# Patient Record
Sex: FEMALE | Race: WHITE | NOT HISPANIC OR LATINO | Employment: STUDENT | URBAN - METROPOLITAN AREA
[De-identification: names, ages, dates, MRNs, and addresses within clinical notes are randomized per-mention and may not be internally consistent; named-entity substitution may affect disease eponyms.]

---
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
## 2019-08-05 ENCOUNTER — ANNUAL EXAM (OUTPATIENT)
Dept: GYNECOLOGY | Facility: CLINIC | Age: 19
End: 2019-08-05
Payer: COMMERCIAL

## 2019-08-05 VITALS
BODY MASS INDEX: 34.62 KG/M2 | WEIGHT: 228.4 LBS | DIASTOLIC BLOOD PRESSURE: 70 MMHG | SYSTOLIC BLOOD PRESSURE: 110 MMHG | HEIGHT: 68 IN

## 2019-08-05 DIAGNOSIS — Z30.011 ENCOUNTER FOR PRESCRIPTION OF ORAL CONTRACEPTIVES: ICD-10-CM

## 2019-08-05 DIAGNOSIS — Z20.2 POSSIBLE EXPOSURE TO STD: ICD-10-CM

## 2019-08-05 DIAGNOSIS — Z01.419 ENCNTR FOR GYN EXAM (GENERAL) (ROUTINE) W/O ABN FINDINGS: Primary | ICD-10-CM

## 2019-08-05 PROCEDURE — 87491 CHLMYD TRACH DNA AMP PROBE: CPT | Performed by: NURSE PRACTITIONER

## 2019-08-05 PROCEDURE — 87591 N.GONORRHOEAE DNA AMP PROB: CPT | Performed by: NURSE PRACTITIONER

## 2019-08-05 PROCEDURE — S0612 ANNUAL GYNECOLOGICAL EXAMINA: HCPCS | Performed by: NURSE PRACTITIONER

## 2019-08-05 RX ORDER — NORETHINDRONE ACETATE AND ETHINYL ESTRADIOL 1MG-20(21)
1 KIT ORAL DAILY
Qty: 84 TABLET | Refills: 3 | Status: SHIPPED | OUTPATIENT
Start: 2019-08-05 | End: 2020-08-17 | Stop reason: SDUPTHER

## 2019-08-05 NOTE — PATIENT INSTRUCTIONS
Pap smear to start at age 23 as per ASCCP guidelines  GC/CT cultures done and waiver signed, always condom use when sexually active, birth control as directed (ACHES reviewed) , use seat belt in every car ride, avoid smoking and alcohol use, exercise most days of the week, appropriate nutrition and hydration, follow up with PCP for appropriate vaccine schedule  HPV vaccine series has not been completed (she believes)  Gardisil recommended for patients ages 9-26  Calcium 1300 mg per day to age 25  Age 24-51 calcium 1000mg daily intake  Vit D daily recommended

## 2019-08-05 NOTE — PROGRESS NOTES
Assessment/Plan:     Pap smear to start at age 24 as per ASCCP guidelines  GC/CT cultures done and waiver signed, always condom use when sexually active, birth control as directed (ACHES reviewed) , use seat belt in every car ride, avoid smoking and alcohol use, exercise most days of the week, appropriate nutrition and hydration, follow up with PCP for appropriate vaccine schedule  HPV vaccine series has not been completed (she believes)  Gardisil recommended for patients ages 9-26  Calcium 1300 mg per day to age 25  Age 24-51 calcium 1000mg daily intake  Vit D daily recommended  Negative depression screen  Diagnoses and all orders for this visit:    Encntr for gyn exam (general) (routine) w/o abn findings    Encounter for prescription of oral contraceptives  -     norethindrone-ethinyl estradiol (JUNEL FE 1/20) 1-20 MG-MCG per tablet; Take 1 tablet by mouth daily    Possible exposure to STD  -     Chlamydia/GC amplified DNA by PCR    BMI 34 0-34 9,adult              Subjective:      Patient ID: Sally Ambrose is a 22 y o  female  Sally Ambrose is a 22 y o  female who is here today for her annual visit  No health concerns  Monthly menses x 5-6 days with mod flow  Menses is acceptable  Taking Junel Fe daily-compliant  Sally Ambrose is sexually active with male partner of 1 year  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  Exercising 2-3 times per week  The following portions of the patient's history were reviewed and updated as appropriate: allergies, current medications, past family history, past medical history, past social history, past surgical history and problem list     Review of Systems   Constitutional: Negative  Negative for activity change, appetite change, chills, diaphoresis, fatigue, fever and unexpected weight change  HENT: Negative for congestion, dental problem, sneezing, sore throat and trouble swallowing      Eyes: Negative for visual disturbance  Respiratory: Negative for chest tightness and shortness of breath  Cardiovascular: Negative for chest pain and leg swelling  Gastrointestinal: Negative for abdominal pain, constipation, diarrhea, nausea and vomiting  Genitourinary: Negative for difficulty urinating, dyspareunia, dysuria, frequency, hematuria, menstrual problem, pelvic pain, urgency, vaginal bleeding, vaginal discharge and vaginal pain  Musculoskeletal: Negative for back pain and neck pain  Skin: Negative  Allergic/Immunologic: Negative  Neurological: Negative for weakness and headaches  Hematological: Negative for adenopathy  Psychiatric/Behavioral: Negative  Objective:      /70 (BP Location: Left arm, Patient Position: Sitting, Cuff Size: Standard)   Ht 5' 8" (1 727 m)   Wt 104 kg (228 lb 6 4 oz)   LMP 07/22/2019   BMI 34 73 kg/m²          Physical Exam   Constitutional: She is oriented to person, place, and time  Vital signs are normal  She appears well-developed and well-nourished  HENT:   Head: Normocephalic and atraumatic  Eyes: Right eye exhibits no discharge  Left eye exhibits no discharge  Neck: Trachea normal and normal range of motion  Neck supple  No thyromegaly present  Cardiovascular: Normal rate, regular rhythm, normal heart sounds and intact distal pulses  Pulmonary/Chest: Effort normal and breath sounds normal  Right breast exhibits no inverted nipple, no mass, no nipple discharge, no skin change and no tenderness  Left breast exhibits no inverted nipple, no mass, no nipple discharge, no skin change and no tenderness  No breast tenderness, discharge or bleeding  Breasts are symmetrical    Abdominal: Soft  Normal appearance  Genitourinary: Vagina normal and uterus normal  Rectal exam shows no external hemorrhoid  No breast tenderness, discharge or bleeding  Pelvic exam was performed with patient supine  No labial fusion   There is no rash, tenderness, lesion or injury on the right labia  There is no rash, tenderness, lesion or injury on the left labia  Cervix exhibits no motion tenderness, no discharge and no friability  Right adnexum displays no mass, no tenderness and no fullness  Left adnexum displays no mass, no tenderness and no fullness  No erythema, tenderness or bleeding in the vagina  No foreign body in the vagina  No signs of injury around the vagina  No vaginal discharge found  Musculoskeletal: Normal range of motion  Lymphadenopathy:        Head (right side): No submental, no submandibular and no tonsillar adenopathy present  Head (left side): No submental, no submandibular and no tonsillar adenopathy present  She has no cervical adenopathy  She has no axillary adenopathy  No inguinal adenopathy noted on the right or left side  Right: No inguinal adenopathy present  Left: No inguinal adenopathy present  Neurological: She is alert and oriented to person, place, and time  Skin: Skin is warm and dry  Psychiatric: She has a normal mood and affect  Nursing note and vitals reviewed

## 2019-08-08 LAB
C TRACH DNA SPEC QL NAA+PROBE: NEGATIVE
N GONORRHOEA DNA SPEC QL NAA+PROBE: NEGATIVE

## 2020-08-02 DIAGNOSIS — Z30.011 ENCOUNTER FOR PRESCRIPTION OF ORAL CONTRACEPTIVES: ICD-10-CM

## 2020-08-03 RX ORDER — NORETHINDRONE ACETATE AND ETHINYL ESTRADIOL AND FERROUS FUMARATE 1MG-20(21)
KIT ORAL
Qty: 84 TABLET | Refills: 3 | OUTPATIENT
Start: 2020-08-03

## 2020-08-15 NOTE — PROGRESS NOTES
Assessment/Plan:  ISABELLE  BCM-    Junel 1/20      Pap smear q 3yrs  Cervical Cultures till 24  - done after permission granted                                     RTO 1 yr  SBA monthly          Obesity- BM 34                                                                        Exercise 3/ wk   - inadequate recommend 4 times a week                                  Calcium 1,000 mg/d with Vit D  - inadequate                   Depression Screen: Neg                                     Diagnoses and all orders for this visit:    Encounter for annual routine gynecological examination    Screening for STD (sexually transmitted disease)  -     Chlamydia/GC amplified DNA by PCR    Surveillance for birth control, oral contraceptives    BMI 34 0-34 9,adult    Encounter for prescription of oral contraceptives  -     norethindrone-ethinyl estradiol (JUNEL FE 1/20) 1-20 MG-MCG per tablet; Take 1 tablet by mouth daily              Subjective:        Patient ID: Nehemiah James is a 20 y o  female  Chaitanya Candido is here for a yearly evaluation  She is without complaints  Her menses is regular  She is still in the same monogamous relationship since 2018  Her health has not changed  The following portions of the patient's history were reviewed and updated as appropriate: She  has no past medical history on file  Patient Active Problem List    Diagnosis Date Noted    Possible exposure to STD 08/05/2019    BMI 34 0-34 9,adult 08/05/2019    Encntr for gyn exam (general) (routine) w/o abn findings 08/02/2018    Encounter for prescription of oral contraceptives 08/02/2018    Routine screening for STI (sexually transmitted infection) 08/02/2018    BMI 31 0-31 9,adult 08/02/2018     She  has a past surgical history that includes [place] tooth extraction    Her family history includes Clotting disorder in her maternal grandfather; Endometriosis in her sister; Heart attack in her paternal grandfather; Hyperlipidemia in her maternal grandfather; Hypertension in her maternal grandfather; No Known Problems in her brother, father, mother, and paternal grandmother  She  reports that she has never smoked  She has never used smokeless tobacco  She reports current alcohol use  She reports that she does not use drugs  Current Outpatient Medications   Medication Sig Dispense Refill    norethindrone-ethinyl estradiol (JUNEL FE 1/20) 1-20 MG-MCG per tablet Take 1 tablet by mouth daily 84 tablet 3     No current facility-administered medications for this visit  Current Outpatient Medications on File Prior to Visit   Medication Sig    [DISCONTINUED] norethindrone-ethinyl estradiol (JUNEL FE 1/20) 1-20 MG-MCG per tablet Take 1 tablet by mouth daily     No current facility-administered medications on file prior to visit  She has No Known Allergies       Review of Systems   Constitutional: Negative for activity change, appetite change, chills, fatigue, fever and unexpected weight change  HENT: Negative for congestion, rhinorrhea, sinus pressure, sore throat and trouble swallowing  Eyes: Negative for discharge, redness, itching and visual disturbance  Respiratory: Negative for cough, chest tightness, shortness of breath and wheezing  Cardiovascular: Negative for chest pain, palpitations and leg swelling  Gastrointestinal: Negative for abdominal distention, abdominal pain, blood in stool, constipation, diarrhea, nausea and vomiting  Genitourinary: Negative for decreased urine volume, difficulty urinating, dyspareunia, dysuria, frequency, hematuria, menstrual problem, pelvic pain, urgency, vaginal bleeding, vaginal discharge and vaginal pain  Musculoskeletal: Negative for arthralgias  Skin: Negative for rash  Neurological: Negative for weakness, light-headedness, numbness and headaches  Hematological: Does not bruise/bleed easily  Psychiatric/Behavioral: Negative for agitation, behavioral problems and sleep disturbance  The patient is not nervous/anxious  Objective:    Vitals:    08/17/20 1301   BP: 116/68   BP Location: Left arm   Patient Position: Sitting   Cuff Size: Large   Temp: 97 5 °F (36 4 °C)   Weight: 104 kg (230 lb)   Height: 5' 8" (1 727 m)            Physical Exam  Vitals signs and nursing note reviewed  Constitutional:       General: She is not in acute distress  Appearance: She is well-developed  She is obese  HENT:      Head: Normocephalic and atraumatic  Eyes:      Conjunctiva/sclera: Conjunctivae normal       Pupils: Pupils are equal, round, and reactive to light  Neck:      Musculoskeletal: Normal range of motion and neck supple  Thyroid: No thyromegaly  Trachea: No tracheal deviation  Cardiovascular:      Rate and Rhythm: Normal rate and regular rhythm  Heart sounds: Normal heart sounds  No murmur  Pulmonary:      Effort: Pulmonary effort is normal  No respiratory distress  Breath sounds: Normal breath sounds  No wheezing  Chest:      Breasts: Breasts are symmetrical          Right: No inverted nipple, mass, nipple discharge, skin change or tenderness  Left: No inverted nipple, mass, nipple discharge, skin change or tenderness  Abdominal:      General: Bowel sounds are normal  There is no distension  Palpations: Abdomen is soft  There is no mass  Tenderness: There is no abdominal tenderness  Genitourinary:     Labia:         Right: No rash, tenderness or lesion  Left: No rash, tenderness or lesion  Vagina: Normal       Cervix: No cervical motion tenderness or discharge  Uterus: Not deviated, not enlarged and not tender  Adnexa:         Right: No mass, tenderness or fullness  Left: No mass, tenderness or fullness  Rectum: No external hemorrhoid  Comments: Urethral meatus within normal limits    Perineum within normal limits  Bladder well supported  Musculoskeletal: Normal range of motion  Skin:     General: Skin is warm and dry  Neurological:      Mental Status: She is alert and oriented to person, place, and time  Psychiatric:         Behavior: Behavior normal          Thought Content:  Thought content normal          Judgment: Judgment normal

## 2020-08-17 ENCOUNTER — ANNUAL EXAM (OUTPATIENT)
Dept: GYNECOLOGY | Facility: CLINIC | Age: 20
End: 2020-08-17
Payer: COMMERCIAL

## 2020-08-17 VITALS
WEIGHT: 230 LBS | BODY MASS INDEX: 34.86 KG/M2 | SYSTOLIC BLOOD PRESSURE: 116 MMHG | HEIGHT: 68 IN | TEMPERATURE: 97.5 F | DIASTOLIC BLOOD PRESSURE: 68 MMHG

## 2020-08-17 DIAGNOSIS — Z30.011 ENCOUNTER FOR PRESCRIPTION OF ORAL CONTRACEPTIVES: ICD-10-CM

## 2020-08-17 DIAGNOSIS — Z30.41 SURVEILLANCE FOR BIRTH CONTROL, ORAL CONTRACEPTIVES: ICD-10-CM

## 2020-08-17 DIAGNOSIS — Z01.419 ENCOUNTER FOR ANNUAL ROUTINE GYNECOLOGICAL EXAMINATION: Primary | ICD-10-CM

## 2020-08-17 DIAGNOSIS — Z11.3 SCREENING FOR STD (SEXUALLY TRANSMITTED DISEASE): ICD-10-CM

## 2020-08-17 PROCEDURE — 87491 CHLMYD TRACH DNA AMP PROBE: CPT | Performed by: OBSTETRICS & GYNECOLOGY

## 2020-08-17 PROCEDURE — S0612 ANNUAL GYNECOLOGICAL EXAMINA: HCPCS | Performed by: OBSTETRICS & GYNECOLOGY

## 2020-08-17 PROCEDURE — 87591 N.GONORRHOEAE DNA AMP PROB: CPT | Performed by: OBSTETRICS & GYNECOLOGY

## 2020-08-17 RX ORDER — NORETHINDRONE ACETATE AND ETHINYL ESTRADIOL 1MG-20(21)
1 KIT ORAL DAILY
Qty: 84 TABLET | Refills: 3 | Status: SHIPPED | OUTPATIENT
Start: 2020-08-17

## 2020-08-22 LAB
C TRACH DNA SPEC QL NAA+PROBE: NEGATIVE
N GONORRHOEA DNA SPEC QL NAA+PROBE: NEGATIVE

## 2021-04-01 ENCOUNTER — TRANSCRIBE ORDERS (OUTPATIENT)
Dept: ADMINISTRATIVE | Facility: HOSPITAL | Age: 21
End: 2021-04-01

## 2021-04-01 DIAGNOSIS — R10.13 EPIGASTRIC PAIN: Primary | ICD-10-CM

## 2021-04-05 ENCOUNTER — HOSPITAL ENCOUNTER (OUTPATIENT)
Dept: RADIOLOGY | Facility: HOSPITAL | Age: 21
Discharge: HOME | End: 2021-04-05
Payer: COMMERCIAL

## 2021-04-05 DIAGNOSIS — R10.13 EPIGASTRIC PAIN: ICD-10-CM

## 2021-04-05 PROCEDURE — 76700 US EXAM ABDOM COMPLETE: CPT

## 2025-07-16 ENCOUNTER — HOSPITAL ENCOUNTER (OUTPATIENT)
Facility: CLINIC | Age: 25
Discharge: HOME | End: 2025-07-16
Attending: FAMILY MEDICINE
Payer: COMMERCIAL

## 2025-07-16 ENCOUNTER — APPOINTMENT (OUTPATIENT)
Dept: RADIOLOGY | Age: 25
End: 2025-07-16
Attending: FAMILY MEDICINE
Payer: COMMERCIAL

## 2025-07-16 VITALS
HEART RATE: 101 BPM | DIASTOLIC BLOOD PRESSURE: 82 MMHG | OXYGEN SATURATION: 99 % | SYSTOLIC BLOOD PRESSURE: 122 MMHG | TEMPERATURE: 97.7 F

## 2025-07-16 DIAGNOSIS — R05.1 ACUTE COUGH: Primary | ICD-10-CM

## 2025-07-16 DIAGNOSIS — L72.9 SKIN CYST: ICD-10-CM

## 2025-07-16 PROCEDURE — 99214 OFFICE O/P EST MOD 30 MIN: CPT | Performed by: FAMILY MEDICINE

## 2025-07-16 PROCEDURE — 71046 X-RAY EXAM CHEST 2 VIEWS: CPT | Performed by: FAMILY MEDICINE

## 2025-07-16 PROCEDURE — S9083 URGENT CARE CENTER GLOBAL: HCPCS | Performed by: FAMILY MEDICINE

## 2025-07-16 RX ORDER — DOXYCYCLINE 100 MG/1
100 CAPSULE ORAL 2 TIMES DAILY
Qty: 14 CAPSULE | Refills: 0 | Status: SHIPPED | OUTPATIENT
Start: 2025-07-16 | End: 2025-07-23

## 2025-07-16 RX ORDER — BENZONATATE 100 MG/1
100 CAPSULE ORAL 3 TIMES DAILY PRN
Qty: 30 CAPSULE | Refills: 0 | Status: SHIPPED | OUTPATIENT
Start: 2025-07-16 | End: 2025-07-26

## 2025-07-16 RX ORDER — FLUTICASONE PROPIONATE 50 MCG
2 SPRAY, SUSPENSION (ML) NASAL DAILY
Qty: 16 G | Refills: 0 | Status: SHIPPED | OUTPATIENT
Start: 2025-07-16

## 2025-07-16 NOTE — DISCHARGE INSTRUCTIONS
Go to ER if symptoms gets worse  If needed to take doxy,Take doxy with food ,eat yogurt and take probiotics.  Avoid sun exposure while on doxy and use sun screen .   Warm compressions  Take tylenol or motrin as needed for pain RT axilla  Follow up with your pcp or dermatology for recheck in 4-5 days ,call for an appt to be seen  May need incision and drainage if gets fluctuant that area in RT axilla

## 2025-07-17 ASSESSMENT — ENCOUNTER SYMPTOMS
CARDIOVASCULAR NEGATIVE: 1
SORE THROAT: 0
NECK PAIN: 0
WHEEZING: 0
COUGH: 1
NEUROLOGICAL NEGATIVE: 1
PSYCHIATRIC NEGATIVE: 1
FEVER: 0
GASTROINTESTINAL NEGATIVE: 1
SHORTNESS OF BREATH: 0
SINUS PAIN: 0
EYES NEGATIVE: 1
BACK PAIN: 0
SINUS PRESSURE: 0

## 2025-07-17 NOTE — ED PROVIDER NOTES
History  Chief Complaint   Patient presents with    Cough     45 Days+  Treated 2 weeks ago at an  with RX    Nasal Congestion     She is here with the c/o cough and congestion,45 Days+  Treated 2 weeks ago at an  with RX for augmentin,she was told her flu/covid /rsv is negative from urgent care  No h/o asthma  Lmp 2 weeks ago  She also stated  she has cyst Rt armpit and painful for couple of days ,no drainage  She had breast reduction surgery in march   She stated she had infection in the lymph nodes in the past and needed to be hospitalized that time.  See details In epic ,she needed IV antibiotics and needed to be seen by ID that time.  She also had similar cysts vaginal area which is better now and no cyst that area now .  No fever          Past Medical History:   Diagnosis Date    Dermatographia     claritin daily    Macromastia        Past Surgical History   Procedure Laterality Date    Bilateral breast reduction Bilateral 3/10/2025    Performed by Eliezer Diamond MD at [facility]    Colposcopy  2024    Pr anesth,surg breast reconstructive  03/10/2025    reduction    [place] tooth extraction  2016       Family History   Problem Relation Name Age of Onset    No Known Problems Biological Mother      Hyperlipidemia Biological Father      Hypertension Biological Father      Endometriosis Biological Sister      Colon cancer Maternal Grandmother      Pancreatic cancer Maternal Grandfather      No Known Problems Paternal Grandmother      Heart disease Paternal Grandfather         Social History     Tobacco Use    Smoking status: Never     Passive exposure: Past    Smokeless tobacco: Never   Vaping Use    Vaping status: Never Used   Substance Use Topics    Alcohol use: Yes     Comment: couple weekends a month    Drug use: Never       Review of Systems   Constitutional:  Negative for fever.   HENT:  Positive for congestion. Negative for ear discharge, ear pain, sinus pressure, sinus pain and sore throat.     Eyes: Negative.    Respiratory:  Positive for cough. Negative for shortness of breath and wheezing.    Cardiovascular: Negative.    Gastrointestinal: Negative.    Genitourinary: Negative.    Musculoskeletal:  Negative for back pain and neck pain.   Skin:  Negative for rash.   Neurological: Negative.    Psychiatric/Behavioral: Negative.         Physical Exam  ED Triage Vitals [07/16/25 1100]   Temp Heart Rate Resp BP SpO2   36.5 °C (97.7 °F) (!) 101 -- 122/82 99 %      Temp Source Heart Rate Source Patient Position BP Location FiO2 (%) (Set)   Tympanic Monitor Sitting Right upper arm --       Physical Exam  Constitutional:       General: She is not in acute distress.  HENT:      Head:      Comments: No sinus tenderness     Nose: Congestion present.      Mouth/Throat:      Mouth: Mucous membranes are moist.      Pharynx: No oropharyngeal exudate or posterior oropharyngeal erythema.   Eyes:      General:         Right eye: No discharge.         Left eye: No discharge.      Conjunctiva/sclera: Conjunctivae normal.      Pupils: Pupils are equal, round, and reactive to light.   Cardiovascular:      Rate and Rhythm: Normal rate and regular rhythm.      Pulses: Normal pulses.      Heart sounds: Normal heart sounds.   Pulmonary:      Effort: Pulmonary effort is normal. No respiratory distress.      Breath sounds: Normal breath sounds. No wheezing, rhonchi or rales.   Musculoskeletal:      Cervical back: Normal range of motion. No tenderness.   Lymphadenopathy:      Cervical: No cervical adenopathy.   Skin:     Comments: Rt axilla : has small cystic swelling or furuncle like present  ,not fluctuant ,has sight erythema and tenderness ,no drainage   Neurological:      Mental Status: She is alert.           Procedures  Procedures    UC Course       Medical Decision Making  Cough ,CXR done ,normal  Could be viral URI or allergies  She recently finished augmentin  Advised to see pcp for follow up  Given flonase and tessalon prn  for cough  Cyst or furuncle RT Axilla ,given doxy to take if symptoms persists.  Go to ER if symptoms gets worse ,given her history that she needed IV antibiotics in the past when she had  Infection.  If needed to take doxy,Take doxy with food ,eat yogurt and take probiotics.  Avoid sun exposure while on doxy and use sun screen .   Warm compressions  Take tylenol or motrin as needed for pain RT axilla  Follow up with your pcp or dermatology for recheck in 4-5 days ,call for an appt to be seen  May need incision and drainage if gets fluctuant that area in RT axilla                       Araceli Welsh MD  07/17/25 0906       Araceli Welsh MD  07/17/25 6164

## 2025-07-30 SDOH — ECONOMIC STABILITY: INCOME INSECURITY: IN THE LAST 12 MONTHS, WAS THERE A TIME WHEN YOU WERE NOT ABLE TO PAY THE MORTGAGE OR RENT ON TIME?: NO

## 2025-07-30 SDOH — ECONOMIC STABILITY: FOOD INSECURITY: WITHIN THE PAST 12 MONTHS, YOU WORRIED THAT YOUR FOOD WOULD RUN OUT BEFORE YOU GOT MONEY TO BUY MORE.: NEVER TRUE

## 2025-07-30 SDOH — ECONOMIC STABILITY: TRANSPORTATION INSECURITY
IN THE PAST 12 MONTHS, HAS LACK OF TRANSPORTATION KEPT YOU FROM MEETINGS, WORK, OR FROM GETTING THINGS NEEDED FOR DAILY LIVING?: NO

## 2025-07-30 SDOH — ECONOMIC STABILITY: FOOD INSECURITY: WITHIN THE PAST 12 MONTHS, THE FOOD YOU BOUGHT JUST DIDN'T LAST AND YOU DIDN'T HAVE MONEY TO GET MORE.: NEVER TRUE

## 2025-07-30 SDOH — ECONOMIC STABILITY: TRANSPORTATION INSECURITY
IN THE PAST 12 MONTHS, HAS THE LACK OF TRANSPORTATION KEPT YOU FROM MEDICAL APPOINTMENTS OR FROM GETTING MEDICATIONS?: NO

## 2025-07-30 ASSESSMENT — SOCIAL DETERMINANTS OF HEALTH (SDOH): IN THE PAST 12 MONTHS, HAS THE ELECTRIC, GAS, OIL, OR WATER COMPANY THREATENED TO SHUT OFF SERVICE IN YOUR HOME?: NO

## 2025-07-31 ENCOUNTER — OFFICE VISIT (OUTPATIENT)
Dept: FAMILY MEDICINE | Facility: CLINIC | Age: 25
End: 2025-07-31
Payer: COMMERCIAL

## 2025-07-31 VITALS
OXYGEN SATURATION: 99 % | TEMPERATURE: 97.1 F | WEIGHT: 220.8 LBS | RESPIRATION RATE: 16 BRPM | BODY MASS INDEX: 33.46 KG/M2 | HEIGHT: 68 IN | SYSTOLIC BLOOD PRESSURE: 110 MMHG | DIASTOLIC BLOOD PRESSURE: 82 MMHG | HEART RATE: 92 BPM

## 2025-07-31 DIAGNOSIS — Z00.00 ENCOUNTER FOR GENERAL ADULT MEDICAL EXAMINATION WITHOUT ABNORMAL FINDINGS: Primary | ICD-10-CM

## 2025-07-31 DIAGNOSIS — G47.00 INSOMNIA, UNSPECIFIED TYPE: ICD-10-CM

## 2025-07-31 PROBLEM — M54.2 NECK PAIN: Status: RESOLVED | Noted: 2024-03-07 | Resolved: 2025-07-31

## 2025-07-31 PROBLEM — M25.521 RIGHT ELBOW PAIN: Status: RESOLVED | Noted: 2025-01-16 | Resolved: 2025-07-31

## 2025-07-31 PROBLEM — N61.0 BREAST INFECTION: Status: RESOLVED | Noted: 2025-03-16 | Resolved: 2025-07-31

## 2025-07-31 PROBLEM — L73.9 FOLLICULITIS: Status: RESOLVED | Noted: 2025-04-04 | Resolved: 2025-07-31

## 2025-07-31 PROCEDURE — 3008F BODY MASS INDEX DOCD: CPT | Performed by: FAMILY MEDICINE

## 2025-07-31 PROCEDURE — 99395 PREV VISIT EST AGE 18-39: CPT | Performed by: FAMILY MEDICINE

## 2025-07-31 ASSESSMENT — ENCOUNTER SYMPTOMS
JOINT SWELLING: 0
LIGHT-HEADEDNESS: 0
TROUBLE SWALLOWING: 0
DIARRHEA: 0
WEAKNESS: 0
SINUS PAIN: 0
PALPITATIONS: 0
VOMITING: 0
BACK PAIN: 0
FLANK PAIN: 0
ABDOMINAL PAIN: 0
COUGH: 0
FREQUENCY: 0
POLYDIPSIA: 0
CONSTIPATION: 0
APPETITE CHANGE: 0
DIZZINESS: 0
FATIGUE: 0
HEADACHES: 0
SHORTNESS OF BREATH: 0
SORE THROAT: 0
FEVER: 0
CHILLS: 0
NAUSEA: 0
RHINORRHEA: 0
WHEEZING: 0
EYE REDNESS: 0
CHOKING: 0
CHEST TIGHTNESS: 0
NUMBNESS: 0
ABDOMINAL DISTENTION: 0
UNEXPECTED WEIGHT CHANGE: 0
HEMATURIA: 0
DIFFICULTY URINATING: 0
DYSURIA: 0
DIAPHORESIS: 0
SINUS PRESSURE: 0

## 2025-07-31 NOTE — PROGRESS NOTES
"Subjective      Patient ID: Brenda Grover is a 24 y.o. female.  2000      HPI    Here today for a physical.  Has had some congestion for a cough months--was seen at  and given antibiotics but did not make any difference.   Following with the breast surgeon--the lumps went away and will monitor at this time.    The following have been reviewed and updated as appropriate in this visit:   Tobacco  Allergies  Meds  Problems  Med Hx  Surg Hx  Fam Hx  Soc   Hx      Review of Systems   Constitutional:  Negative for appetite change, chills, diaphoresis, fatigue, fever and unexpected weight change.   HENT:  Positive for congestion. Negative for ear discharge, ear pain, nosebleeds, postnasal drip, rhinorrhea, sinus pressure, sinus pain, sneezing, sore throat and trouble swallowing.    Eyes:  Negative for redness and visual disturbance.   Respiratory:  Negative for cough, choking, chest tightness, shortness of breath and wheezing.    Cardiovascular:  Negative for chest pain, palpitations and leg swelling.   Gastrointestinal:  Negative for abdominal distention, abdominal pain, constipation, diarrhea, nausea and vomiting.   Endocrine: Negative for polydipsia.   Genitourinary:  Negative for difficulty urinating, dysuria, flank pain, frequency, genital sores, hematuria and urgency.   Musculoskeletal:  Negative for back pain and joint swelling.   Skin:  Negative for rash.   Neurological:  Negative for dizziness, weakness, light-headedness, numbness and headaches.       Objective     Vitals:    07/31/25 0835   BP: 110/82   BP Location: Left upper arm   Patient Position: Sitting   Pulse: 92   Resp: 16   Temp: 36.2 °C (97.1 °F)   TempSrc: Temporal   SpO2: 99%   Weight: 100 kg (220 lb 12.8 oz)   Height: 1.727 m (5' 8\")     Body mass index is 33.57 kg/m².    Physical Exam  Vitals and nursing note reviewed.   Constitutional:       General: She is not in acute distress.     Appearance: Normal appearance. She is " well-developed. She is not diaphoretic.   HENT:      Head: Normocephalic and atraumatic.      Right Ear: Tympanic membrane, ear canal and external ear normal.      Left Ear: Tympanic membrane, ear canal and external ear normal.      Nose: Nose normal.      Mouth/Throat:      Pharynx: No oropharyngeal exudate.   Eyes:      General:         Right eye: No discharge.         Left eye: No discharge.      Conjunctiva/sclera: Conjunctivae normal.      Pupils: Pupils are equal, round, and reactive to light.   Neck:      Thyroid: No thyromegaly.   Cardiovascular:      Rate and Rhythm: Normal rate and regular rhythm.      Heart sounds: Normal heart sounds. No murmur heard.     No friction rub. No gallop.   Pulmonary:      Effort: Pulmonary effort is normal. No respiratory distress.      Breath sounds: Normal breath sounds. No stridor. No wheezing, rhonchi or rales.   Abdominal:      General: Bowel sounds are normal. There is no distension.      Palpations: Abdomen is soft. There is no mass.      Tenderness: There is no abdominal tenderness. There is no guarding or rebound.      Hernia: No hernia is present.   Musculoskeletal:         General: No deformity. Normal range of motion.      Cervical back: Normal range of motion.   Lymphadenopathy:      Cervical: No cervical adenopathy.   Skin:     General: Skin is warm.      Findings: No rash.   Neurological:      Mental Status: She is alert and oriented to person, place, and time.         Assessment & Plan  Encounter for general adult medical examination without abnormal findings  UTD with lab work.  UTD with gyn.  UTD with Tdap.  Will continue with antihistamine and nasal spray and follow-up with ENT if congestion does not improve.       Insomnia, unspecified type  Continue trazodone as needed.

## 2025-07-31 NOTE — ASSESSMENT & PLAN NOTE
UTD with lab work.  UTD with gyn.  UTD with Tdap.  Will continue with antihistamine and nasal spray and follow-up with ENT if congestion does not improve.